# Patient Record
Sex: FEMALE | Race: WHITE
[De-identification: names, ages, dates, MRNs, and addresses within clinical notes are randomized per-mention and may not be internally consistent; named-entity substitution may affect disease eponyms.]

---

## 2017-01-13 ENCOUNTER — HOSPITAL ENCOUNTER (OUTPATIENT)
Dept: HOSPITAL 84 - D.OPS | Age: 72
Discharge: HOME | End: 2017-01-13
Attending: SURGERY
Payer: MEDICARE

## 2017-01-13 VITALS — DIASTOLIC BLOOD PRESSURE: 56 MMHG | SYSTOLIC BLOOD PRESSURE: 100 MMHG

## 2017-01-13 VITALS — BODY MASS INDEX: 29.82 KG/M2 | HEIGHT: 67 IN | WEIGHT: 190 LBS

## 2017-01-13 DIAGNOSIS — D12.2: Primary | ICD-10-CM

## 2017-01-13 LAB
ANION GAP SERPL CALC-SCNC: 17.3 MMOL/L (ref 8–16)
BUN SERPL-MCNC: 7 MG/DL (ref 7–18)
CALCIUM SERPL-MCNC: 9.8 MG/DL (ref 8.5–10.1)
CHLORIDE SERPL-SCNC: 104 MMOL/L (ref 98–107)
CO2 SERPL-SCNC: 21.9 MMOL/L (ref 21–32)
CREAT SERPL-MCNC: 0.9 MG/DL (ref 0.6–1.3)
ERYTHROCYTE [DISTWIDTH] IN BLOOD BY AUTOMATED COUNT: 12.5 % (ref 11.5–14.5)
GLUCOSE SERPL-MCNC: 204 MG/DL (ref 74–106)
HCT VFR BLD CALC: 49.1 % (ref 36–48)
HGB BLD-MCNC: 16.4 G/DL (ref 12–16)
MCH RBC QN AUTO: 31.8 PG (ref 26–34)
MCHC RBC AUTO-ENTMCNC: 33.4 G/DL (ref 31–37)
MCV RBC: 95.3 FL (ref 80–100)
OSMOLALITY SERPL CALC.SUM OF ELEC: 281 MOSM/KG (ref 275–300)
PLATELET # BLD: 224 10X3/UL (ref 130–400)
PMV BLD AUTO: 10 FL (ref 7.4–10.4)
POTASSIUM SERPL-SCNC: 4.2 MMOL/L (ref 3.5–5.1)
RBC # BLD AUTO: 5.15 10X6/UL (ref 4–5.4)
SODIUM SERPL-SCNC: 139 MMOL/L (ref 136–145)
WBC # BLD AUTO: 7.3 10X3/UL (ref 4.8–10.8)

## 2017-01-13 NOTE — NUR
PATIENT LEFT ON STRETCHER FOR PROCEDURE, DARK AREAS NOTED ON BUTTOCKS
WITH SOME SMALL SCRATCY SCABBY AREA ON LEFT BUTTOCK, WARREN.

## 2017-03-01 NOTE — OP
PATIENT NAME:  RAMON BROTHERS                          MEDICAL RECORD: [id number]
:43                                             LOCATION:D.OPS          
                                                         ADMISSION DATE:        
SURGEON:  CISCO NAYAK MD            
 
 
DATE OF OPERATION:  2017
 
PREOPERATIVE DIAGNOSIS:  Right-sided colon polyp, 4-5 cm, complex due to its
size and its location.
 
POSTOPERATIVE DIAGNOSIS:  Right-sided colon polyp, 4-5 cm, complex due to its
size and its location.
 
PROCEDURES:
1.  Total colonoscopy to cecum.
2.  Snare polypectomy and then multiple cold biopsies were obtained of the
residual polypoid tissue.  I then ablated any residual polypoid tissue with the
argon plasma coagulator.  Placement of 4 endoscopic clips for vascular
hemostasis as well as to close a submucosal defect.
 
OPERATIVE COURSE:  The patient was conveyed to the operating room electively on
2017.  General anesthesia was induced by the anesthesia staff.  The
patient was placed in the Fontaine position.  A digital rectal examination was
performed.  The colonoscope was inserted through the anus.  It was easily
advanced to the cecum.  The prep was adequate.  The pullback was greater than a
14-minute pullback.  I dragged the folds.  I irrigated and aspirated
extensively.
 
In the mid ascending colon, the polyp was easily identifiable.  I advanced a
snare wrapped it around the polyp and performed a snare polypectomy.  This
removed about 90% of the polyp and the remaining 10% of the polypoid tissue at
the base.  I performed cold endoscopic biopsy.  The patient was left with not a
full thickness defect, but the mucosa had been removed and perhaps some of the
submucosa.  For this reason, the area was weak.  There was also bleeding to some
degree.
 
I ablated the remaining polypoid tissue with the argon plasma coagulator.  I
then began to lay out down a row of clips to close this defect, which is at risk
for developing into a full thickness perforation.  I tried to lay down a row of
7 clips.  Unfortunately, because it was on fold, it was difficult to obtain some
purchase with some these clips.  So a total of 4 of these clips were placed and
this is largely closed the defect that I described.  An endoscopic retrieval
device was advanced.  I grasped the polyp.  It was then withdrawn slowly out
through the anus.  Upon withdrawal, I noted no other polypoid lesions.
 
The patient was then extubated and conveyed to post-anesthesia care unit, where
she was in stable condition.  She going to be dismissed home while on Flagyl as
she is at a risk for a post-polypectomy syndrome.  I will see her in the office
in 2-3 weeks.
 
TRANSINT:[id number] Voice Confirmation ID: [id number] DOCUMENT ID: [id number]
 
 
 
OPERATIVE REPORT                               [id number]    RAMON BROTHERS, CISCO BENEDICT            
 
 
 
Electronically Signed by CISCO NAYAK on 17 at 1017
 
 
 
 
 
 
 
 
 
 
 
 
 
 
 
 
 
 
 
 
 
 
 
 
 
 
 
 
 
 
 
 
 
 
 
 
 
 
 
 
 
CC:                                                             [id number]
DICTATION DATE: 17 1226     :     17 1319      [facility] 
                                                                      [id number] [place] [address]

## 2017-03-01 NOTE — HP
PATIENT: RAMON BROTHERS                                MEDICAL RECORD: [id number]
ACCOUNT: [id number]                                    LOCATION:[facility]         
: 43                                            ADMISSION DATE: 17
                                                         
 
                             HISTORY AND PHYSICAL EXAMINATION
 
 
CHIEF COMPLAINT:  Polyp.
 
I have personally reviewed the endoscopic photos.  I have personally reviewed
the endoscopic report.
 
I do not have a pathology report.  We are unable to obtain one.
 
The worrisome polyp is a sessile polyp on a fold that is 4-5 cm in length and
wraps around the fold.  Cold biopsies were obtained.  The area was then
tattooed.  It was felt to be too large for routine endoscopic removal.  I am
going to plan for a polypectomy utilizing the argon plasma coagulator.  The
risks, possible complications and alternatives to procedure were explained to
the patient.  She elects to proceed.
 
Unfortunately, although she was ready to have this procedure done, it escaped
everybody that she had not signed her consent form prior to the procedure.  The
nurse went out and talked to family and they signed the form.
 
SOCIAL HISTORY:  She is a smoker.  I have advised her to quit smoking.
 
PAST MEDICAL HISTORY AND PAST SURGICAL HISTORY:  Sleep apnea, not on CPAP,
noninsulin-dependent diabetes mellitus, colon polyp, and hyperlipidemia.
 
HOME MEDICATIONS:  Aspirin, Amaryl, Glucophage, and Pravachol.
 
ALLERGIES:  No known drug allergies.
 
REVIEW OF SYSTEMS:  Negative for coronary artery disease or hypertension. 
Negative for CVA or seizures.  Negative for renal disease or hepatitis.  The
review of systems is negative other than as is described above.
 
PHYSICAL EXAMINATION:
GENERAL:  The patient does not appear acutely ill.  She does not appear
chronically ill.
VITAL SIGNS:  Reviewed.
HEAD:  External ears appear normal.
EYES:  Extraocular movements are intact.
NECK:  Trachea is midline.
CHEST:  No intercostal retractions.
PULMONARY:  Nonlabored, no stridor.
ABDOMEN:  No peritonitis with movement.
 
IMPRESSION:  A worrisome polyp of the mid descending colon, complex due to its
size and its location.
 
PLAN:  Colonoscopy, polypectomy utilizing the argon plasma coagulator.
 
TRANSINT:[id number] Voice Confirmation ID: [id number] DOCUMENT ID: [id number]
 
 
 
 
 
HISTORY AND PHYSICAL                           [id number]    RAMON BROTHERSCISCO WHITNEY MD            
 
 
 
Electronically Signed by CISCO NAYAK on 17 at 1017
 
 
 
 
 
 
 
 
 
 
 
 
 
 
 
 
 
 
 
 
 
 
 
 
 
 
 
 
 
 
 
 
 
 
 
 
 
 
 
 
 
CC: KINJAL QUEZADA DO and EDY COVINGTON MD                 [id number]
DICTATION DATE: 17 1222     :     17 1238      [facility] [place] 
                                                                      [address] [address] [place] [address]

## 2018-02-09 ENCOUNTER — HOSPITAL ENCOUNTER (OUTPATIENT)
Dept: HOSPITAL 84 - D.OPS | Age: 73
Discharge: HOME | End: 2018-02-09
Attending: SURGERY
Payer: MEDICARE

## 2018-02-09 VITALS — DIASTOLIC BLOOD PRESSURE: 71 MMHG | SYSTOLIC BLOOD PRESSURE: 100 MMHG

## 2018-02-09 VITALS — BODY MASS INDEX: 28.1 KG/M2

## 2018-02-09 DIAGNOSIS — K63.5: Primary | ICD-10-CM

## 2018-02-09 DIAGNOSIS — F17.200: ICD-10-CM

## 2018-02-09 DIAGNOSIS — I10: ICD-10-CM

## 2018-02-09 DIAGNOSIS — Z01.812: ICD-10-CM

## 2018-02-09 DIAGNOSIS — E11.9: ICD-10-CM

## 2018-02-09 LAB
ANION GAP SERPL CALC-SCNC: 19.1 MMOL/L (ref 8–16)
BUN SERPL-MCNC: 14 MG/DL (ref 7–18)
CALCIUM SERPL-MCNC: 9.2 MG/DL (ref 8.5–10.1)
CHLORIDE SERPL-SCNC: 98 MMOL/L (ref 98–107)
CO2 SERPL-SCNC: 20.7 MMOL/L (ref 21–32)
CREAT SERPL-MCNC: 1.1 MG/DL (ref 0.6–1.3)
ERYTHROCYTE [DISTWIDTH] IN BLOOD BY AUTOMATED COUNT: 11.8 % (ref 11.5–14.5)
GLUCOSE SERPL-MCNC: 345 MG/DL (ref 74–106)
HCT VFR BLD CALC: 45.5 % (ref 36–48)
HGB BLD-MCNC: 15.9 G/DL (ref 12–16)
MCH RBC QN AUTO: 33.3 PG (ref 26–34)
MCHC RBC AUTO-ENTMCNC: 34.9 G/DL (ref 31–37)
MCV RBC: 95.4 FL (ref 80–100)
OSMOLALITY SERPL CALC.SUM OF ELEC: 282 MOSM/KG (ref 275–300)
PLATELET # BLD: 240 10X3/UL (ref 130–400)
PMV BLD AUTO: 10.7 FL (ref 7.4–10.4)
POTASSIUM SERPL-SCNC: 3.8 MMOL/L (ref 3.5–5.1)
RBC # BLD AUTO: 4.77 10X6/UL (ref 4–5.4)
SODIUM SERPL-SCNC: 134 MMOL/L (ref 136–145)
WBC # BLD AUTO: 7.8 10X3/UL (ref 4.8–10.8)

## 2019-10-01 ENCOUNTER — HOSPITAL ENCOUNTER (OUTPATIENT)
Dept: HOSPITAL 84 - D.OPS | Age: 74
Discharge: HOME | End: 2019-10-01
Attending: SURGERY
Payer: MEDICARE

## 2019-10-01 VITALS — WEIGHT: 207.44 LBS | HEIGHT: 68 IN | BODY MASS INDEX: 31.44 KG/M2

## 2019-10-01 VITALS — DIASTOLIC BLOOD PRESSURE: 65 MMHG | SYSTOLIC BLOOD PRESSURE: 109 MMHG

## 2019-10-01 DIAGNOSIS — K63.5: Primary | ICD-10-CM

## 2019-10-01 DIAGNOSIS — Z86.010: ICD-10-CM

## 2019-10-01 LAB
ANION GAP SERPL CALC-SCNC: 14.3 MMOL/L (ref 8–16)
BUN SERPL-MCNC: 16 MG/DL (ref 7–18)
CALCIUM SERPL-MCNC: 8.8 MG/DL (ref 8.5–10.1)
CHLORIDE SERPL-SCNC: 103 MMOL/L (ref 98–107)
CO2 SERPL-SCNC: 26.7 MMOL/L (ref 21–32)
CREAT SERPL-MCNC: 0.8 MG/DL (ref 0.6–1.3)
ERYTHROCYTE [DISTWIDTH] IN BLOOD BY AUTOMATED COUNT: 12.6 % (ref 11.5–14.5)
GLUCOSE SERPL-MCNC: 151 MG/DL (ref 74–106)
HCT VFR BLD CALC: 47.4 % (ref 36–48)
HGB BLD-MCNC: 15.8 G/DL (ref 12–16)
MCH RBC QN AUTO: 32.8 PG (ref 26–34)
MCHC RBC AUTO-ENTMCNC: 33.3 G/DL (ref 31–37)
MCV RBC: 98.3 FL (ref 80–100)
OSMOLALITY SERPL CALC.SUM OF ELEC: 282 MOSM/KG (ref 275–300)
PLATELET # BLD: 231 10X3/UL (ref 130–400)
PMV BLD AUTO: 9.8 FL (ref 7.4–10.4)
POTASSIUM SERPL-SCNC: 4 MMOL/L (ref 3.5–5.1)
RBC # BLD AUTO: 4.82 10X6/UL (ref 4–5.4)
SODIUM SERPL-SCNC: 140 MMOL/L (ref 136–145)
WBC # BLD AUTO: 7.2 10X3/UL (ref 4.8–10.8)

## 2019-10-01 NOTE — HP
PATIENT: RAMON BROTHERS                                MEDICAL RECORD: [id number]
ACCOUNT: [id number]                                    LOCATION:[facility]         
: 43                                            ADMISSION DATE: 10/01/19
                                                         PCP: KINJAL QUEZADA DO     
 
                             HISTORY AND PHYSICAL EXAMINATION
 
 
CHIEF COMPLAINT:  History of complex colon polyp.
 
HISTORY OF PRESENT ILLNESS:  The patient has undergone history of endoscopic
resection of a complex colon polyp several times in the past.  She is here to
undergo surveillance colonoscopy.  It is likely that there is going to be some
regrowth of the polyp, so we will perform polypectomy utilizing argon plasma
coagulator, perhaps endoscopic mucosal resection.  It has been over a year since
I last saw her.  The patient had a complex colon polyp, which was treated with a
piecemeal snare polypectomy.  The pathology on the specimen revealed multiple
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
 
The patient had no rectal bleeding.  No abdominal pain.  She is very hard of
hearing.
 
HOME MEDICATIONS:  Please see the nursing list.
 
ALLERGIES:  No known drug allergies.
 
SOCIAL HISTORY:  She is a smoker.
 
PAST MEDICAL AND SURGICAL HISTORY:  Sleep apnea, hypertension, type 2 diabetes
mellitus, lower extremity edema, cataract surgery, history of hysterectomy,
history of colonoscopy, history of colon polyps, history of removal of a lipoma,
history of laparoscopic cholecystectomy.
 
PHYSICAL EXAMINATION:
GENERAL:  The patient does not appear acutely ill.  She does not appear
chronically ill.
VITAL SIGNS:  Reviewed.
EARS:  External ears appear normal.
EYES:  Extraocular movements are intact.
NECK:  Trachea is midline.
CHEST:  No intercostal retractions.
PULMONARY:  Nonlabored, no stridor.
The entire examination was performed in the presence of a female nurse.
 
IMPRESSION:  History of complex colon polyp of the ascending colon.
 
PLAN:  Colonoscopy, polypectomy with possible endoscopic mucosal resection,
possible treatment with the argon plasma coagulator.
 
TRANSINT:[id number] Voice Confirmation ID: [id number] DOCUMENT ID: [id number]
 
 
 
 
 
HISTORY AND PHYSICAL                           [id number]    RAMON BROTHERS CISCO CAMPA MD            
 
 
 
Electronically Signed by CISCO NAYAK on 10/01/19 at 1120
 
 
 
 
 
 
 
 
 
 
 
 
 
 
 
 
 
 
 
 
 
 
 
 
 
 
 
 
 
 
 
 
 
 
 
 
 
 
 
 
 
CC: KINJAL QUEZADA DO                                          [id number]
DICTATION DATE: 10/01/19 0906     :     10/01/19 1115      [facility]                                          
[address] [place] [address]

## 2019-10-01 NOTE — OP
PATIENT NAME:  RAMON BROTHERS                          MEDICAL RECORD: [id number]
:43                                             LOCATION:D.OPS          
                                                         ADMISSION DATE:        
SURGEON:  CISCO NAYAK MD            
 
 
DATE OF OPERATION:  10/01/2019
 
PREOPERATIVE DIAGNOSIS:  History of complex ascending colon polyp with regrowth.
 
POSTOPERATIVE DIAGNOSES:  History of complex ascending colon polyp with regrowth
with further regrowth.  This polyp was on the ileocecal valve.
 
PROCEDURES:
1.  Total colonoscopy to cecum.
2.  Submucosal epinephrine injection.
3.  Endoscopic mucosal resection, polypectomy.
4.  Tattooing of the ileocecal valve.  The polyp is on the superior lip of the
ileocecal valve.
 
SURGEON:  Cisco Nayak MD
 
ASSISTANT:  None.
 
BLOOD LOSS:  Minimal.
 
ANESTHESIA:  IV sedation.
 
COMPLICATIONS:  None.
 
It was necessary to have the anesthesia staff present during the procedure to
ensure the patient did not run into respiratory problems as she is a significant
smoker.
 
ENDOSCOPIC COURSE:  The patient was conveyed to the endoscopy suite electively
on 10/01/2019.  IV sedation was induced by the anesthesia staff.  The patient
was placed in the Fontaine position.  A digital rectal examination was performed.  A
colonoscope was inserted through the anus.  It was easily advanced to the cecum.
 The prep was excellent.  A retroflexed view was obtained in the cecum.  The
polyp was easily identified.  I advanced the sclerotherapy needle.  A submucosal
injection of epinephrine was performed for a post-procedural hemostasis.  I then
injected Eleview into the submucosa of the ileocecal valve.  This was in
preparation for the endoscopic mucosal resection, polypectomy.  I then advanced
a snare.  A piecemeal snare polypectomy was performed.  This completed the
endoscopic mucosal resection.  Some additional polypoid tissue was grasped with
a cold biopsy forceps.  I then suctioned up the portion of the polyp into a
polyp trap.
 
I then advanced the sclerotherapy needle again.  A submucosal injection of Diamond
ink was performed to tattoo the area.
 
The argon plasma coagulator was then advanced.  Utilizing the right colon
setting in the forced mode, I then ablated any residual polypoid tissue.
 
The endoscope was then withdrawn under direct vision.  A combination of normal
imaging and narrow band imaging were utilized.  I irrigated and aspirated
extensively.  The pullback was greater than an 18-minute pullback.  A
retroflexed view was obtained in the rectum.  I then unretroflexed the scope and
 
 
 
OPERATIVE REPORT                               [id number]    DENEENRAMON LUZ MARINA        
 
 
removed it under direct vision.
 
I will see the patient back in my office in 2-3 weeks.  I will plan for her next
colonoscopy to take place in 2 years.
 
TRANSINT:[id number] Voice Confirmation ID: [id number] DOCUMENT ID: [id number]
                                           
                                           CISCO NAYAK MD            
 
 
 
Electronically Signed by CISCO NAYAK on 10/01/19 at 1743
 
 
 
 
 
 
 
 
 
 
 
 
 
 
 
 
 
 
 
 
 
 
 
 
 
 
 
 
 
 
 
 
 
 
 
CC: KINJAL QUEZADA DO                                          [id number]
DICTATION DATE: 10/01/19 1002     :     10/01/19 1224      [facility] [place] 
                                                                      10/01/19
[facility]                                          
[address] [place] [address]

## 2019-10-01 NOTE — NUR
DC INSTRUCTIONS GIVEN TO PT/FAMILY. STATE UNDERSTANDING. DC'D IV CATH
FULLY INTACT. ABLE TO TOLERATE FULL LIQUID DIET.